# Patient Record
Sex: MALE
[De-identification: names, ages, dates, MRNs, and addresses within clinical notes are randomized per-mention and may not be internally consistent; named-entity substitution may affect disease eponyms.]

---

## 2023-05-05 ENCOUNTER — NURSE TRIAGE (OUTPATIENT)
Dept: OTHER | Facility: CLINIC | Age: 83
End: 2023-05-05

## 2023-05-05 NOTE — TELEPHONE ENCOUNTER
Location of patient: [place]    Subjective: Caller states \"They changed his G-tube this morning and we are waiting to hear the results of placement\"     Current Symptoms: RN caring for pt waiting to hear of results of G-tube placement      Recommended disposition: Callback by PCP within 1 hour    Care advice provided, patient verbalizes understanding; denies any other questions or concerns. Outcome: RN connected with on NP who reports she will log into the system and call [facility] back with results.        This triage is a result of a call to the [address]    Reason for Disposition   Nursing judgment    Protocols used: No Protocol Available-ADULT-[place]